# Patient Record
Sex: FEMALE | Race: WHITE | URBAN - METROPOLITAN AREA
[De-identification: names, ages, dates, MRNs, and addresses within clinical notes are randomized per-mention and may not be internally consistent; named-entity substitution may affect disease eponyms.]

---

## 2021-09-14 ENCOUNTER — APPOINTMENT (RX ONLY)
Dept: URBAN - METROPOLITAN AREA CLINIC 84 | Facility: CLINIC | Age: 61
Setting detail: DERMATOLOGY
End: 2021-09-14

## 2021-09-14 DIAGNOSIS — D22 MELANOCYTIC NEVI: ICD-10-CM

## 2021-09-14 DIAGNOSIS — L81.4 OTHER MELANIN HYPERPIGMENTATION: ICD-10-CM

## 2021-09-14 DIAGNOSIS — Z12.83 ENCOUNTER FOR SCREENING FOR MALIGNANT NEOPLASM OF SKIN: ICD-10-CM

## 2021-09-14 DIAGNOSIS — D18.0 HEMANGIOMA: ICD-10-CM

## 2021-09-14 DIAGNOSIS — L82.1 OTHER SEBORRHEIC KERATOSIS: ICD-10-CM

## 2021-09-14 PROBLEM — D18.01 HEMANGIOMA OF SKIN AND SUBCUTANEOUS TISSUE: Status: ACTIVE | Noted: 2021-09-14

## 2021-09-14 PROBLEM — D22.5 MELANOCYTIC NEVI OF TRUNK: Status: ACTIVE | Noted: 2021-09-14

## 2021-09-14 PROCEDURE — ? FULL BODY SKIN EXAM

## 2021-09-14 PROCEDURE — ? SUNSCREEN RECOMMENDATIONS

## 2021-09-14 PROCEDURE — ? COUNSELING

## 2021-09-14 PROCEDURE — 99203 OFFICE O/P NEW LOW 30 MIN: CPT

## 2021-09-14 ASSESSMENT — LOCATION ZONE DERM
LOCATION ZONE: SCALP
LOCATION ZONE: TRUNK

## 2021-09-14 ASSESSMENT — LOCATION SIMPLE DESCRIPTION DERM
LOCATION SIMPLE: ABDOMEN
LOCATION SIMPLE: ANTERIOR SCALP

## 2021-09-14 ASSESSMENT — LOCATION DETAILED DESCRIPTION DERM
LOCATION DETAILED: MID-FRONTAL SCALP
LOCATION DETAILED: EPIGASTRIC SKIN

## 2024-09-04 ENCOUNTER — APPOINTMENT (RX ONLY)
Dept: URBAN - METROPOLITAN AREA CLINIC 84 | Facility: CLINIC | Age: 64
Setting detail: DERMATOLOGY
End: 2024-09-04

## 2024-09-04 DIAGNOSIS — L82.0 INFLAMED SEBORRHEIC KERATOSIS: ICD-10-CM

## 2024-09-04 DIAGNOSIS — L81.4 OTHER MELANIN HYPERPIGMENTATION: ICD-10-CM

## 2024-09-04 DIAGNOSIS — D22 MELANOCYTIC NEVI: ICD-10-CM

## 2024-09-04 DIAGNOSIS — D18.0 HEMANGIOMA: ICD-10-CM

## 2024-09-04 DIAGNOSIS — L30.4 ERYTHEMA INTERTRIGO: ICD-10-CM | Status: RESOLVING

## 2024-09-04 DIAGNOSIS — L82.1 OTHER SEBORRHEIC KERATOSIS: ICD-10-CM

## 2024-09-04 DIAGNOSIS — Z12.83 ENCOUNTER FOR SCREENING FOR MALIGNANT NEOPLASM OF SKIN: ICD-10-CM

## 2024-09-04 PROBLEM — D18.01 HEMANGIOMA OF SKIN AND SUBCUTANEOUS TISSUE: Status: ACTIVE | Noted: 2024-09-04

## 2024-09-04 PROBLEM — L30.9 DERMATITIS, UNSPECIFIED: Status: ACTIVE | Noted: 2024-09-04

## 2024-09-04 PROBLEM — D48.5 NEOPLASM OF UNCERTAIN BEHAVIOR OF SKIN: Status: ACTIVE | Noted: 2024-09-04

## 2024-09-04 PROBLEM — D22.39 MELANOCYTIC NEVI OF OTHER PARTS OF FACE: Status: ACTIVE | Noted: 2024-09-04

## 2024-09-04 PROBLEM — D22.5 MELANOCYTIC NEVI OF TRUNK: Status: ACTIVE | Noted: 2024-09-04

## 2024-09-04 PROCEDURE — 17110 DESTRUCTION B9 LES UP TO 14: CPT

## 2024-09-04 PROCEDURE — 99213 OFFICE O/P EST LOW 20 MIN: CPT | Mod: 25

## 2024-09-04 PROCEDURE — 69100 BIOPSY OF EXTERNAL EAR: CPT

## 2024-09-04 PROCEDURE — ? COUNSELING

## 2024-09-04 PROCEDURE — ? ADDITIONAL NOTES

## 2024-09-04 PROCEDURE — ? MEDICARE ABN

## 2024-09-04 PROCEDURE — ? TREATMENT REGIMEN

## 2024-09-04 PROCEDURE — ? LIQUID NITROGEN

## 2024-09-04 PROCEDURE — ? BIOPSY BY SHAVE METHOD

## 2024-09-04 PROCEDURE — ? FULL BODY SKIN EXAM

## 2024-09-04 ASSESSMENT — LOCATION DETAILED DESCRIPTION DERM
LOCATION DETAILED: RIGHT UPPER CUTANEOUS LIP
LOCATION DETAILED: LEFT INFERIOR LATERAL MIDBACK
LOCATION DETAILED: PERIUMBILICAL SKIN
LOCATION DETAILED: RIGHT INFRAMAMMARY CREASE (INNER QUADRANT)
LOCATION DETAILED: LEFT LATERAL EYEBROW
LOCATION DETAILED: LEFT MID-UPPER BACK
LOCATION DETAILED: EPIGASTRIC SKIN
LOCATION DETAILED: LEFT RIB CAGE
LOCATION DETAILED: SUPERIOR MID FOREHEAD

## 2024-09-04 ASSESSMENT — LOCATION SIMPLE DESCRIPTION DERM
LOCATION SIMPLE: SUPERIOR FOREHEAD
LOCATION SIMPLE: RIGHT BREAST
LOCATION SIMPLE: LEFT UPPER BACK
LOCATION SIMPLE: ABDOMEN
LOCATION SIMPLE: LEFT EYEBROW
LOCATION SIMPLE: RIGHT LIP
LOCATION SIMPLE: LEFT LOWER BACK

## 2024-09-04 ASSESSMENT — LOCATION ZONE DERM
LOCATION ZONE: TRUNK
LOCATION ZONE: LIP
LOCATION ZONE: FACE

## 2024-09-04 NOTE — PROCEDURE: ADDITIONAL NOTES
Additional Notes: Will call if desire medication.
Detail Level: Detailed
Render Risk Assessment In Note?: no

## 2024-09-04 NOTE — HPI: OTHER
Condition:: Glass shard in finger from phone
Please Describe Your Condition:: Patient got a piece of glass from her phone in her finger.

## 2024-09-04 NOTE — PROCEDURE: LIQUID NITROGEN
Show Topical Anesthesia Variable?: Yes
Consent: The patient's consent was obtained including but not limited to risks of crusting, scabbing, blistering, scarring, darker or lighter pigmentary change, recurrence, incomplete removal and infection.
Render Note In Bullet Format When Appropriate: No
Number Of Freeze-Thaw Cycles: 2 freeze-thaw cycles
Medical Necessity Clause: This procedure was medically necessary because the lesions that were treated were: irriated
Medical Necessity Information: It is in your best interest to select a reason for this procedure from the list below. All of these items fulfill various CMS LCD requirements except the new and changing color options.
Detail Level: Detailed
Post-Care Instructions: I reviewed with the patient in detail post-care instructions. Patient is to wear sunprotection, and avoid picking at any of the treated lesions. Pt may apply Vaseline to crusted or scabbing areas.
Spray Paint Text: The liquid nitrogen was applied to the skin utilizing a spray paint frosting technique.

## 2025-02-25 ENCOUNTER — APPOINTMENT (OUTPATIENT)
Dept: URBAN - METROPOLITAN AREA CLINIC 84 | Facility: CLINIC | Age: 65
Setting detail: DERMATOLOGY
End: 2025-02-25

## 2025-02-25 DIAGNOSIS — L92.3 FOREIGN BODY GRANULOMA OF THE SKIN AND SUBCUTANEOUS TISSUE: ICD-10-CM

## 2025-02-25 DIAGNOSIS — D22 MELANOCYTIC NEVI: ICD-10-CM

## 2025-02-25 PROBLEM — D48.5 NEOPLASM OF UNCERTAIN BEHAVIOR OF SKIN: Status: ACTIVE | Noted: 2025-02-25

## 2025-02-25 PROCEDURE — 10120 INC&RMVL FB SUBQ TISS SMPL: CPT

## 2025-02-25 PROCEDURE — ? BIOPSY BY SHAVE METHOD

## 2025-02-25 PROCEDURE — ? FOREIGN BODY REMOVAL

## 2025-02-25 PROCEDURE — ? COUNSELING

## 2025-02-25 PROCEDURE — 11102 TANGNTL BX SKIN SINGLE LES: CPT

## 2025-02-25 ASSESSMENT — LOCATION ZONE DERM
LOCATION ZONE: FINGER
LOCATION ZONE: FACE

## 2025-02-25 ASSESSMENT — LOCATION DETAILED DESCRIPTION DERM
LOCATION DETAILED: RIGHT INDEX FINGERTIP
LOCATION DETAILED: LEFT LATERAL EYEBROW

## 2025-02-25 ASSESSMENT — LOCATION SIMPLE DESCRIPTION DERM
LOCATION SIMPLE: RIGHT INDEX FINGER
LOCATION SIMPLE: LEFT EYEBROW

## 2025-02-25 NOTE — PROCEDURE: FOREIGN BODY REMOVAL
Incision Method: a 15 blade
Detail Level: Detailed
Bill For Simple Or Complicated Fb Removal?: simple
Anesthesia Volume In Cc: 0.5

## 2025-02-28 ENCOUNTER — APPOINTMENT (OUTPATIENT)
Dept: URBAN - METROPOLITAN AREA CLINIC 84 | Facility: CLINIC | Age: 65
Setting detail: DERMATOLOGY
End: 2025-02-28

## 2025-02-28 DIAGNOSIS — Z48.817 ENCOUNTER FOR SURGICAL AFTERCARE FOLLOWING SURGERY ON THE SKIN AND SUBCUTANEOUS TISSUE: ICD-10-CM

## 2025-02-28 PROCEDURE — ? POST-OP WOUND CHECK

## 2025-02-28 PROCEDURE — ? ADDITIONAL NOTES

## 2025-02-28 ASSESSMENT — LOCATION SIMPLE DESCRIPTION DERM: LOCATION SIMPLE: RIGHT INDEX FINGER

## 2025-02-28 ASSESSMENT — LOCATION DETAILED DESCRIPTION DERM: LOCATION DETAILED: RIGHT INDEX FINGERTIP

## 2025-02-28 ASSESSMENT — LOCATION ZONE DERM: LOCATION ZONE: FINGER

## 2025-02-28 NOTE — PROCEDURE: ADDITIONAL NOTES
Detail Level: Generalized
Additional Notes: Patient states that she still feels like there is something in her finger, patient advised to go to urgent care for XRay
Render Risk Assessment In Note?: no

## 2025-02-28 NOTE — PROCEDURE: POST-OP WOUND CHECK
Detail Level: Simple
Add 57720 Cpt? (Important Note: In 2017 The Use Of 34675 Is Being Tracked By Cms To Determine Future Global Period Reimbursement For Global Periods): no
Wound Evaluated By: Alexandra Rosado NP